# Patient Record
Sex: MALE | URBAN - METROPOLITAN AREA
[De-identification: names, ages, dates, MRNs, and addresses within clinical notes are randomized per-mention and may not be internally consistent; named-entity substitution may affect disease eponyms.]

---

## 2021-05-03 ENCOUNTER — OFFICE VISIT (OUTPATIENT)
Dept: URBAN - METROPOLITAN AREA CLINIC 16 | Facility: CLINIC | Age: 71
End: 2021-05-03
Payer: MEDICARE

## 2021-05-03 DIAGNOSIS — E11.9 TYPE 2 DIABETES MELLITUS WITHOUT COMPLICATIONS: Primary | ICD-10-CM

## 2021-05-03 DIAGNOSIS — H25.13 AGE-RELATED NUCLEAR CATARACT, BILATERAL: ICD-10-CM

## 2021-05-03 PROCEDURE — 99203 OFFICE O/P NEW LOW 30 MIN: CPT | Performed by: OPTOMETRIST

## 2021-05-03 ASSESSMENT — KERATOMETRY
OD: 42.50
OS: 42.63

## 2021-05-03 ASSESSMENT — INTRAOCULAR PRESSURE
OD: 14
OS: 14

## 2021-05-03 NOTE — IMPRESSION/PLAN
Impression: Type 2 diabetes mellitus without complications: L60.0. Plan: Diabetes type II: no background retinopathy, no signs of neovascularization or macular edema noted. Discussed ocular and systemic benefits of blood sugar control.

## 2022-07-26 ENCOUNTER — OFFICE VISIT (OUTPATIENT)
Dept: URBAN - METROPOLITAN AREA CLINIC 16 | Facility: CLINIC | Age: 72
End: 2022-07-26
Payer: MEDICARE

## 2022-07-26 DIAGNOSIS — H25.13 AGE-RELATED NUCLEAR CATARACT, BILATERAL: ICD-10-CM

## 2022-07-26 DIAGNOSIS — E11.9 TYPE 2 DIABETES MELLITUS WITHOUT COMPLICATIONS: Primary | ICD-10-CM

## 2022-07-26 PROCEDURE — 99213 OFFICE O/P EST LOW 20 MIN: CPT | Performed by: OPTOMETRIST

## 2022-07-26 ASSESSMENT — INTRAOCULAR PRESSURE
OS: 13
OD: 11

## 2022-07-26 NOTE — IMPRESSION/PLAN
Impression: Type 2 diabetes mellitus without complications: P57.0. Optos ordered and reviewed today. Plan: Diabetes type II: no background retinopathy, no signs of neovascularization or macular edema noted. Discussed ocular and systemic benefits of blood sugar control.